# Patient Record
Sex: MALE | Race: WHITE | NOT HISPANIC OR LATINO | Employment: FULL TIME | URBAN - METROPOLITAN AREA
[De-identification: names, ages, dates, MRNs, and addresses within clinical notes are randomized per-mention and may not be internally consistent; named-entity substitution may affect disease eponyms.]

---

## 2024-07-05 ENCOUNTER — APPOINTMENT (OUTPATIENT)
Dept: PRIMARY CARE | Facility: CLINIC | Age: 39
End: 2024-07-05
Payer: COMMERCIAL

## 2024-07-09 ENCOUNTER — APPOINTMENT (OUTPATIENT)
Dept: PRIMARY CARE | Facility: CLINIC | Age: 39
End: 2024-07-09
Payer: COMMERCIAL

## 2024-07-09 VITALS
SYSTOLIC BLOOD PRESSURE: 125 MMHG | WEIGHT: 154.7 LBS | HEART RATE: 80 BPM | HEIGHT: 69 IN | DIASTOLIC BLOOD PRESSURE: 70 MMHG | BODY MASS INDEX: 22.91 KG/M2

## 2024-07-09 DIAGNOSIS — N50.811 PAIN IN RIGHT TESTICLE: ICD-10-CM

## 2024-07-09 DIAGNOSIS — L98.9 BACK SKIN LESION: ICD-10-CM

## 2024-07-09 DIAGNOSIS — Z11.3 ROUTINE SCREENING FOR STI (SEXUALLY TRANSMITTED INFECTION): ICD-10-CM

## 2024-07-09 DIAGNOSIS — S39.012A STRAIN OF MUSCLE, FASCIA AND TENDON OF LOWER BACK, INITIAL ENCOUNTER: Primary | ICD-10-CM

## 2024-07-09 DIAGNOSIS — R10.31 RIGHT LOWER QUADRANT ABDOMINAL PAIN: ICD-10-CM

## 2024-07-09 DIAGNOSIS — Z00.00 ROUTINE GENERAL MEDICAL EXAMINATION AT A HEALTH CARE FACILITY: ICD-10-CM

## 2024-07-09 PROCEDURE — 1036F TOBACCO NON-USER: CPT | Performed by: INTERNAL MEDICINE

## 2024-07-09 PROCEDURE — 99204 OFFICE O/P NEW MOD 45 MIN: CPT | Performed by: INTERNAL MEDICINE

## 2024-07-09 RX ORDER — CYCLOBENZAPRINE HCL 10 MG
10 TABLET ORAL 3 TIMES DAILY PRN
Qty: 30 TABLET | Refills: 0 | Status: SHIPPED | OUTPATIENT
Start: 2024-07-09 | End: 2024-09-07

## 2024-07-09 RX ORDER — METHYLPREDNISOLONE 4 MG/1
TABLET ORAL
Qty: 21 TABLET | Refills: 0 | Status: SHIPPED | OUTPATIENT
Start: 2024-07-09 | End: 2024-07-16

## 2024-07-09 ASSESSMENT — PATIENT HEALTH QUESTIONNAIRE - PHQ9
1. LITTLE INTEREST OR PLEASURE IN DOING THINGS: NOT AT ALL
2. FEELING DOWN, DEPRESSED OR HOPELESS: NOT AT ALL
SUM OF ALL RESPONSES TO PHQ9 QUESTIONS 1 AND 2: 0

## 2024-07-09 ASSESSMENT — ENCOUNTER SYMPTOMS
BACK PAIN: 1
ABDOMINAL PAIN: 1
FREQUENCY: 1
CONSTIPATION: 0
DIARRHEA: 0

## 2024-07-09 NOTE — ASSESSMENT & PLAN NOTE
-Concern for muscle strain. Will trial muscle relaxer. Discussed how to take medication safely; will likely only be able to use at night. Medrol dose pack sent in as well.  -Referring to PT in case symptoms persist.

## 2024-07-09 NOTE — PROGRESS NOTES
"Subjective   Patient ID: Ralph Swan is a 39 y.o. male who presents for Establish Care.    Pt here for multiple reasons; has been having RLQ pain, R testicular pain, R-sided back pain, has a back lump, and wants to get appropriate cancer screening done.    States he hurt his back 2 weeks ago while hiking. Thinks he pulled a muscle at that time. Has pain along the lower R side of his back that gets worse from flexing his hips and from laying down at night. Has tried motrin w/o relief. Tries to stretch frequently for this and has tried warm bathes with mild relief.    RLQ pain started end of last summer/early fall. Was seen at [facility] and diagnosed w/ pancreatitis at that time. Pain feels like a sharp pressure that can occasionally radiate burning pain to the groin. Pain is intermittent and pt isn't sure what brings it on. Has regular BMs, but has noticed he has increased urinary frequency (has to go every 30-60 mins).    R testicular pain started 3 months ago. States he has always been sensitive there his entire life. Has been with the same partner and denies any new ones over the last year. Is wondering if he can be tested for STIs just in case though.    Finally has lump on his L lower back which he first noticed a month ago. Area is painful to touch, but hasn't changed in size since he noticed it.        Review of Systems   Gastrointestinal:  Positive for abdominal pain. Negative for constipation and diarrhea.   Genitourinary:  Positive for frequency and testicular pain.   Musculoskeletal:  Positive for back pain.       /70 (BP Location: Right arm, Patient Position: Sitting, BP Cuff Size: Adult)   Pulse 80   Ht 1.756 m (5' 9.15\")   Wt 70.2 kg (154 lb 11.2 oz)   BMI 22.74 kg/m²   Objective   Physical Exam  Constitutional:       General: He is not in acute distress.     Appearance: He is not ill-appearing, toxic-appearing or diaphoretic.   HENT:      Head: Normocephalic and atraumatic.   Eyes:      " Conjunctiva/sclera: Conjunctivae normal.   Cardiovascular:      Rate and Rhythm: Normal rate and regular rhythm.      Heart sounds: No murmur heard.     No friction rub. No gallop.   Pulmonary:      Effort: Pulmonary effort is normal. No respiratory distress.      Breath sounds: No stridor. No wheezing, rhonchi or rales.   Abdominal:      General: Abdomen is flat. Bowel sounds are normal. There is no distension.      Palpations: Abdomen is soft. There is no mass.      Tenderness: There is no abdominal tenderness. There is right CVA tenderness. There is no guarding.      Hernia: No hernia is present.   Musculoskeletal:         General: Tenderness (L lower back with mobile lesion that is <1cm; above L sacroiliac joint) present.   Neurological:      Mental Status: He is alert.         Assessment/Plan   Problem List Items Addressed This Visit             ICD-10-CM    Strain of muscle, fascia and tendon of lower back, initial encounter - Primary S39.012A     -Concern for muscle strain. Will trial muscle relaxer. Discussed how to take medication safely; will likely only be able to use at night. Medrol dose pack sent in as well.  -Referring to PT in case symptoms persist.         Relevant Medications    cyclobenzaprine (Flexeril) 10 mg tablet    methylPREDNISolone (Medrol Dospak) 4 mg tablets    Other Relevant Orders    Referral to Physical Therapy    Right lower quadrant abdominal pain R10.31     -Unclear etiology. Has had pain since last Fall, but is currently obscured by back pain.  -CT A/P from ER visit reviewed. No signs of hernia noted. Will get repeat labwork and UA to start and address back pain.         Relevant Orders    Comprehensive metabolic panel    CBC and Auto Differential    Urinalysis with Reflex Culture and Microscopic    Pain in right testicle N50.811     -Testing for STI.  -Referring to urology given hx. Pt agreeable.         Back skin lesion L98.9     -Concern for lipoma. Will try to get US to better  visualize to start.         Relevant Orders    US pelvis     Other Visit Diagnoses         Codes    Routine screening for STI (sexually transmitted infection)     Z11.3    Relevant Orders    Referral to Urology    Hepatitis B Surface Antigen    C. Trachomatis / N. Gonorrhoeae, Amplified Detection    HIV 1/2 Antigen/Antibody Screen with Reflex to Confirmation    Hepatitis C Antibody    Syphilis Screen with Reflex    Routine general medical examination at a health care facility     Z00.00    Relevant Orders    Hemoglobin A1c    Lipid panel        -Labwork as above.  -Will see back in 1 month to continue workup.         Zora Hickman MD 07/09/24 1:07 PM

## 2024-07-09 NOTE — ASSESSMENT & PLAN NOTE
-Unclear etiology. Has had pain since last Fall, but is currently obscured by back pain.  -CT A/P from ER visit reviewed. No signs of hernia noted. Will get repeat labwork and UA to start and address back pain.

## 2024-07-23 ENCOUNTER — HOSPITAL ENCOUNTER (OUTPATIENT)
Dept: RADIOLOGY | Facility: HOSPITAL | Age: 39
Discharge: HOME | End: 2024-07-23
Payer: COMMERCIAL

## 2024-07-23 DIAGNOSIS — L98.9 BACK SKIN LESION: ICD-10-CM

## 2024-07-23 PROCEDURE — 76882 US LMTD JT/FCL EVL NVASC XTR: CPT

## 2024-07-24 ENCOUNTER — TELEPHONE (OUTPATIENT)
Dept: PRIMARY CARE | Facility: CLINIC | Age: 39
End: 2024-07-24
Payer: COMMERCIAL

## 2024-07-24 NOTE — TELEPHONE ENCOUNTER
Patient called to inform you that he also found a lump on the back of his neck as well.  He did go for the US for the one on his back.

## 2024-07-26 ENCOUNTER — TELEPHONE (OUTPATIENT)
Dept: PRIMARY CARE | Facility: CLINIC | Age: 39
End: 2024-07-26

## 2024-07-26 ENCOUNTER — LAB (OUTPATIENT)
Dept: LAB | Facility: LAB | Age: 39
End: 2024-07-26
Payer: COMMERCIAL

## 2024-07-26 DIAGNOSIS — R10.31 RIGHT LOWER QUADRANT ABDOMINAL PAIN: ICD-10-CM

## 2024-07-26 DIAGNOSIS — L98.9 BACK SKIN LESION: Primary | ICD-10-CM

## 2024-07-26 DIAGNOSIS — L72.9 CYST OF SKIN: Primary | ICD-10-CM

## 2024-07-26 DIAGNOSIS — Z00.00 ROUTINE GENERAL MEDICAL EXAMINATION AT A HEALTH CARE FACILITY: ICD-10-CM

## 2024-07-26 DIAGNOSIS — Z11.3 ROUTINE SCREENING FOR STI (SEXUALLY TRANSMITTED INFECTION): ICD-10-CM

## 2024-07-26 LAB
ALBUMIN SERPL BCP-MCNC: 4.5 G/DL (ref 3.4–5)
ALP SERPL-CCNC: 57 U/L (ref 33–120)
ALT SERPL W P-5'-P-CCNC: 17 U/L (ref 10–52)
ANION GAP SERPL CALC-SCNC: 13 MMOL/L (ref 10–20)
APPEARANCE UR: CLEAR
AST SERPL W P-5'-P-CCNC: 19 U/L (ref 9–39)
BASOPHILS # BLD AUTO: 0.06 X10*3/UL (ref 0–0.1)
BASOPHILS NFR BLD AUTO: 1.3 %
BILIRUB SERPL-MCNC: 0.6 MG/DL (ref 0–1.2)
BILIRUB UR STRIP.AUTO-MCNC: NEGATIVE MG/DL
BUN SERPL-MCNC: 25 MG/DL (ref 6–23)
C TRACH RRNA SPEC QL NAA+PROBE: NEGATIVE
CALCIUM SERPL-MCNC: 9.7 MG/DL (ref 8.6–10.6)
CHLORIDE SERPL-SCNC: 102 MMOL/L (ref 98–107)
CHOLEST SERPL-MCNC: 148 MG/DL (ref 0–199)
CHOLESTEROL/HDL RATIO: 3.8
CO2 SERPL-SCNC: 30 MMOL/L (ref 21–32)
COLOR UR: NORMAL
CREAT SERPL-MCNC: 1.08 MG/DL (ref 0.5–1.3)
EGFRCR SERPLBLD CKD-EPI 2021: >90 ML/MIN/1.73M*2
EOSINOPHIL # BLD AUTO: 0.06 X10*3/UL (ref 0–0.7)
EOSINOPHIL NFR BLD AUTO: 1.3 %
ERYTHROCYTE [DISTWIDTH] IN BLOOD BY AUTOMATED COUNT: 11.9 % (ref 11.5–14.5)
EST. AVERAGE GLUCOSE BLD GHB EST-MCNC: 94 MG/DL
GLUCOSE SERPL-MCNC: 97 MG/DL (ref 74–99)
GLUCOSE UR STRIP.AUTO-MCNC: NORMAL MG/DL
HBA1C MFR BLD: 4.9 %
HBV SURFACE AG SERPL QL IA: NONREACTIVE
HCT VFR BLD AUTO: 44.9 % (ref 41–52)
HCV AB SER QL: NONREACTIVE
HDLC SERPL-MCNC: 38.5 MG/DL
HGB BLD-MCNC: 15.2 G/DL (ref 13.5–17.5)
HIV 1+2 AB+HIV1 P24 AG SERPL QL IA: NONREACTIVE
HOLD SPECIMEN: NORMAL
IMM GRANULOCYTES # BLD AUTO: 0.01 X10*3/UL (ref 0–0.7)
IMM GRANULOCYTES NFR BLD AUTO: 0.2 % (ref 0–0.9)
KETONES UR STRIP.AUTO-MCNC: NEGATIVE MG/DL
LDLC SERPL CALC-MCNC: 94 MG/DL
LEUKOCYTE ESTERASE UR QL STRIP.AUTO: NEGATIVE
LYMPHOCYTES # BLD AUTO: 1.3 X10*3/UL (ref 1.2–4.8)
LYMPHOCYTES NFR BLD AUTO: 28.4 %
MCH RBC QN AUTO: 31.1 PG (ref 26–34)
MCHC RBC AUTO-ENTMCNC: 33.9 G/DL (ref 32–36)
MCV RBC AUTO: 92 FL (ref 80–100)
MONOCYTES # BLD AUTO: 0.4 X10*3/UL (ref 0.1–1)
MONOCYTES NFR BLD AUTO: 8.7 %
N GONORRHOEA DNA SPEC QL PROBE+SIG AMP: NEGATIVE
NEUTROPHILS # BLD AUTO: 2.75 X10*3/UL (ref 1.2–7.7)
NEUTROPHILS NFR BLD AUTO: 60.1 %
NITRITE UR QL STRIP.AUTO: NEGATIVE
NON HDL CHOLESTEROL: 110 MG/DL (ref 0–149)
NRBC BLD-RTO: 0 /100 WBCS (ref 0–0)
PH UR STRIP.AUTO: 7 [PH]
PLATELET # BLD AUTO: 274 X10*3/UL (ref 150–450)
POTASSIUM SERPL-SCNC: 4 MMOL/L (ref 3.5–5.3)
PROT SERPL-MCNC: 6.9 G/DL (ref 6.4–8.2)
PROT UR STRIP.AUTO-MCNC: NEGATIVE MG/DL
RBC # BLD AUTO: 4.89 X10*6/UL (ref 4.5–5.9)
RBC # UR STRIP.AUTO: NEGATIVE /UL
SODIUM SERPL-SCNC: 141 MMOL/L (ref 136–145)
SP GR UR STRIP.AUTO: 1.02
TREPONEMA PALLIDUM IGG+IGM AB [PRESENCE] IN SERUM OR PLASMA BY IMMUNOASSAY: NONREACTIVE
TRIGL SERPL-MCNC: 78 MG/DL (ref 0–149)
UROBILINOGEN UR STRIP.AUTO-MCNC: NORMAL MG/DL
VLDL: 16 MG/DL (ref 0–40)
WBC # BLD AUTO: 4.6 X10*3/UL (ref 4.4–11.3)

## 2024-07-26 PROCEDURE — 80061 LIPID PANEL: CPT

## 2024-07-26 PROCEDURE — 86803 HEPATITIS C AB TEST: CPT

## 2024-07-26 PROCEDURE — 81003 URINALYSIS AUTO W/O SCOPE: CPT

## 2024-07-26 PROCEDURE — 80053 COMPREHEN METABOLIC PANEL: CPT

## 2024-07-26 PROCEDURE — 86780 TREPONEMA PALLIDUM: CPT

## 2024-07-26 PROCEDURE — 83036 HEMOGLOBIN GLYCOSYLATED A1C: CPT

## 2024-07-26 PROCEDURE — 85025 COMPLETE CBC W/AUTO DIFF WBC: CPT

## 2024-07-26 PROCEDURE — 87389 HIV-1 AG W/HIV-1&-2 AB AG IA: CPT

## 2024-07-26 PROCEDURE — 87340 HEPATITIS B SURFACE AG IA: CPT

## 2024-07-26 PROCEDURE — 87591 N.GONORRHOEAE DNA AMP PROB: CPT

## 2024-07-26 PROCEDURE — 87491 CHLMYD TRACH DNA AMP PROBE: CPT

## 2024-07-26 PROCEDURE — 36415 COLL VENOUS BLD VENIPUNCTURE: CPT

## 2024-07-26 NOTE — TELEPHONE ENCOUNTER
----- Message from Zora Hickman sent at 7/26/2024 10:20 AM EDT -----  US showing non-specific cyst. Recommend pt be seen in diagnostic clinic to see if it needs to be drained and sent to lab, in addition to further eval of new neck lump. Referral placed.

## 2024-08-07 ENCOUNTER — OFFICE VISIT (OUTPATIENT)
Dept: PRIMARY CARE | Facility: CLINIC | Age: 39
End: 2024-08-07
Payer: COMMERCIAL

## 2024-08-07 VITALS
WEIGHT: 158.2 LBS | SYSTOLIC BLOOD PRESSURE: 135 MMHG | DIASTOLIC BLOOD PRESSURE: 82 MMHG | HEART RATE: 93 BPM | RESPIRATION RATE: 18 BRPM | BODY MASS INDEX: 23.26 KG/M2

## 2024-08-07 DIAGNOSIS — S39.012A STRAIN OF MUSCLE, FASCIA AND TENDON OF LOWER BACK, INITIAL ENCOUNTER: ICD-10-CM

## 2024-08-07 DIAGNOSIS — N50.811 PAIN IN RIGHT TESTICLE: ICD-10-CM

## 2024-08-07 DIAGNOSIS — R10.31 RIGHT LOWER QUADRANT ABDOMINAL PAIN: Primary | ICD-10-CM

## 2024-08-07 DIAGNOSIS — F90.9 ATTENTION DEFICIT HYPERACTIVITY DISORDER (ADHD), UNSPECIFIED ADHD TYPE: ICD-10-CM

## 2024-08-07 PROCEDURE — 99212 OFFICE O/P EST SF 10 MIN: CPT | Performed by: INTERNAL MEDICINE

## 2024-08-07 PROCEDURE — 1036F TOBACCO NON-USER: CPT | Performed by: INTERNAL MEDICINE

## 2024-08-07 ASSESSMENT — ENCOUNTER SYMPTOMS
ABDOMINAL PAIN: 1
BACK PAIN: 1
CONSTIPATION: 0

## 2024-08-07 NOTE — PROGRESS NOTES
Subjective   Patient ID: Ralph Swan is a 39 y.o. male who presents for Follow-up (1m follow-up).    Back pain ultimately improved. Didn't end up taking steroids d/t concern for side effects.    Pain along RLQ has been present for almost a year. Pain feels occasionally sharp, occasionally sore, or more like a rolling pain. Radiates from RLQ to groin but doesn't fully go to that area. Comes on from movement, heavy lifting and from holding his bladder (gets relief from urinating). Pain has worsened and pt feels weaker since it started. Has hx appendectomy in 2011.         Review of Systems   Gastrointestinal:  Positive for abdominal pain. Negative for constipation.   Musculoskeletal:  Positive for back pain.       /82 (BP Location: Right arm, Patient Position: Sitting)   Pulse 93   Resp 18   Wt 71.8 kg (158 lb 3.2 oz)   BMI 23.26 kg/m²   Objective   Physical Exam  Constitutional:       General: He is not in acute distress.     Appearance: He is not ill-appearing, toxic-appearing or diaphoretic.   HENT:      Head: Normocephalic and atraumatic.   Eyes:      Conjunctiva/sclera: Conjunctivae normal.   Abdominal:      General: Abdomen is flat. There is no distension.      Palpations: Abdomen is soft. There is no mass.      Tenderness: There is abdominal tenderness (epigastric). There is no guarding.   Musculoskeletal:      Comments: 1 Mobile nodule along R temple and another along c-spine   Neurological:      Mental Status: He is alert.         Assessment/Plan   Problem List Items Addressed This Visit             ICD-10-CM    Strain of muscle, fascia and tendon of lower back, initial encounter S39.012A    Right lower quadrant abdominal pain - Primary R10.31    Pain in right testicle N50.811     Other Visit Diagnoses         Codes    Attention deficit hyperactivity disorder (ADHD), unspecified ADHD type     F90.9    Relevant Orders    Referral to [facility]        -Unclear etiology of  abd/testicular pain. Pt to follow up with urology later this month. Won't repeat CT given it was last done 9/2023 and symptoms had already started before that. Pt agreeable with plan for this and to follow up general surgery recommendations first.  -Pt w/ hx ADHD when he was 9 but stopped treatment at age 20. Referred to psychiatry to be diagnosed.  -To return in 2 months.         Zora Hickman MD 08/07/24 2:19 PM

## 2024-08-09 ENCOUNTER — APPOINTMENT (OUTPATIENT)
Dept: PRIMARY CARE | Facility: CLINIC | Age: 39
End: 2024-08-09
Payer: COMMERCIAL

## 2024-08-09 ENCOUNTER — OFFICE VISIT (OUTPATIENT)
Dept: SURGERY | Facility: CLINIC | Age: 39
End: 2024-08-09
Payer: COMMERCIAL

## 2024-08-09 VITALS
WEIGHT: 158 LBS | TEMPERATURE: 98 F | BODY MASS INDEX: 23.23 KG/M2 | HEART RATE: 101 BPM | SYSTOLIC BLOOD PRESSURE: 121 MMHG | DIASTOLIC BLOOD PRESSURE: 83 MMHG

## 2024-08-09 DIAGNOSIS — L72.9 CYST OF SKIN: Primary | ICD-10-CM

## 2024-08-09 PROCEDURE — 99213 OFFICE O/P EST LOW 20 MIN: CPT | Performed by: SURGERY

## 2024-08-09 PROCEDURE — 1036F TOBACCO NON-USER: CPT | Performed by: SURGERY

## 2024-08-09 PROCEDURE — 99203 OFFICE O/P NEW LOW 30 MIN: CPT | Performed by: SURGERY

## 2024-08-09 ASSESSMENT — ENCOUNTER SYMPTOMS: DEPRESSION: 0

## 2024-08-09 ASSESSMENT — PAIN SCALES - GENERAL: PAINLEVEL: 0-NO PAIN

## 2024-08-09 NOTE — PROGRESS NOTES
Subjective   Patient ID: Ralph Swan is a 39 y.o. male who presents for     HPI  Patient was referred by his PCP for cysts. He noticed lumps forming over the past few months. States he has one on his forehead, the back of his neck, and his right lower back. States the right lower back lump can be painful when it rubs on his clothing. Patient had an US and was told he had a lipoma on his upper back.     PMH- denies any health issues    PSH- appendectomy, tonsillectomy    Patient vapes 20 times a day    Review of Systems    Objective   Physical Exam    Well-nourished, well-developed. In no distress  Alert and oriented x 3  Lungs are clear to auscultation bilaterally.  Cardiac exam is regular rhythm and rate.  Abdomen is soft nontender nondistended.  Neurologic exam is without focal deficit.   Very subtle subcutaneous nodule left flank just above the belt line    Very subtle soft tissue density midline posterior neck        US: 7/24/24  Nonspecific 6 x 5 x 4 mm cyst in the subcutaneous fat along the left upper back.        Assessment/Plan   Diagnoses and all orders for this visit:  Cyst of skin  -     Referral to General Surgery  -     Case Request Operating Room: Excision Lesion Abdominal Wall; Standing  Other orders  -     Place in outpatient/hospital ambulatory surgery; Standing  -     Full code; Standing  Impressio:   Very small cysts and I have reassured him these are benign.  The lesion in the left flank is bothering him with pain and tenderness.  Plan to take this out as a minor procedure under local       Verna Shannon DPM PGY-1  
Calm

## 2024-08-09 NOTE — LETTER
August 9, 2024     Zora Hickman MD  [address]  [address]  [facility] [address]    Patient: Ralph Swan   YOB: 1985   Date of Visit: 8/9/2024       Dear Dr. Zora Hickman MD:    Thank you for referring Ralph Swan to me for evaluation. Below are my notes for this consultation.  If you have questions, please do not hesitate to call me. I look forward to following your patient along with you.       Sincerely,     Naresh Mccall MD      CC: No Recipients  ______________________________________________________________________________________    Subjective  Patient ID: Ralph Swan is a 39 y.o. male who presents for     HPI  Patient was referred by his PCP for cysts. He noticed lumps forming over the past few months. States he has one on his forehead, the back of his neck, and his right lower back. States the right lower back lump can be painful when it rubs on his clothing. Patient had an US and was told he had a lipoma on his upper back.     PMH- denies any health issues    PSH- appendectomy, tonsillectomy    Patient vapes 20 times a day    Review of Systems    Objective  Physical Exam    Well-nourished, well-developed. In no distress  Alert and oriented x 3  Lungs are clear to auscultation bilaterally.  Cardiac exam is regular rhythm and rate.  Abdomen is soft nontender nondistended.  Neurologic exam is without focal deficit.   Very subtle subcutaneous nodule left flank just above the belt line    Very subtle soft tissue density midline posterior neck        US: 7/24/24  Nonspecific 6 x 5 x 4 mm cyst in the subcutaneous fat along the left upper back.        Assessment/Plan  Diagnoses and all orders for this visit:  Cyst of skin  -     Referral to General Surgery  -     Case Request Operating Room: Excision Lesion Abdominal Wall; Standing  Other orders  -     Place in outpatient/hospital ambulatory surgery; Standing  -     Full code; Standing  Impressio:   Very small cysts and I have  reassured him these are benign.  The lesion in the left flank is bothering him with pain and tenderness.  Plan to take this out as a minor procedure under local       Verna Shannon DPM PGY-1

## 2024-08-09 NOTE — H&P (VIEW-ONLY)
Subjective   Patient ID: Ralph Swan is a 39 y.o. male who presents for     HPI  Patient was referred by his PCP for cysts. He noticed lumps forming over the past few months. States he has one on his forehead, the back of his neck, and his right lower back. States the right lower back lump can be painful when it rubs on his clothing. Patient had an US and was told he had a lipoma on his upper back.     PMH- denies any health issues    PSH- appendectomy, tonsillectomy    Patient vapes 20 times a day    Review of Systems    Objective   Physical Exam    Well-nourished, well-developed. In no distress  Alert and oriented x 3  Lungs are clear to auscultation bilaterally.  Cardiac exam is regular rhythm and rate.  Abdomen is soft nontender nondistended.  Neurologic exam is without focal deficit.   Very subtle subcutaneous nodule left flank just above the belt line    Very subtle soft tissue density midline posterior neck        US: 7/24/24  Nonspecific 6 x 5 x 4 mm cyst in the subcutaneous fat along the left upper back.        Assessment/Plan   Diagnoses and all orders for this visit:  Cyst of skin  -     Referral to General Surgery  -     Case Request Operating Room: Excision Lesion Abdominal Wall; Standing  Other orders  -     Place in outpatient/hospital ambulatory surgery; Standing  -     Full code; Standing  Impressio:   Very small cysts and I have reassured him these are benign.  The lesion in the left flank is bothering him with pain and tenderness.  Plan to take this out as a minor procedure under local       Verna Shannon DPM PGY-1

## 2024-08-14 PROBLEM — L72.9 CYST OF SKIN: Status: ACTIVE | Noted: 2024-08-09

## 2024-09-04 ENCOUNTER — HOSPITAL ENCOUNTER (OUTPATIENT)
Facility: CLINIC | Age: 39
Setting detail: OUTPATIENT SURGERY
Discharge: HOME | End: 2024-09-04
Attending: SURGERY | Admitting: SURGERY
Payer: COMMERCIAL

## 2024-09-04 VITALS
BODY MASS INDEX: 23.39 KG/M2 | RESPIRATION RATE: 16 BRPM | SYSTOLIC BLOOD PRESSURE: 127 MMHG | HEART RATE: 58 BPM | WEIGHT: 163.36 LBS | TEMPERATURE: 98.2 F | HEIGHT: 70 IN | OXYGEN SATURATION: 99 % | DIASTOLIC BLOOD PRESSURE: 78 MMHG

## 2024-09-04 DIAGNOSIS — L72.9 CYST OF SKIN: ICD-10-CM

## 2024-09-04 PROCEDURE — 2500000005 HC RX 250 GENERAL PHARMACY W/O HCPCS: Performed by: SURGERY

## 2024-09-04 PROCEDURE — 3600000008 HC OR TIME - EACH INCREMENTAL 1 MINUTE - PROCEDURE LEVEL THREE: Performed by: SURGERY

## 2024-09-04 PROCEDURE — 2720000007 HC OR 272 NO HCPCS: Performed by: SURGERY

## 2024-09-04 PROCEDURE — 7100000010 HC PHASE TWO TIME - EACH INCREMENTAL 1 MINUTE: Performed by: SURGERY

## 2024-09-04 PROCEDURE — 3600000003 HC OR TIME - INITIAL BASE CHARGE - PROCEDURE LEVEL THREE: Performed by: SURGERY

## 2024-09-04 PROCEDURE — 11402 EXC TR-EXT B9+MARG 1.1-2 CM: CPT | Performed by: SURGERY

## 2024-09-04 PROCEDURE — 2500000004 HC RX 250 GENERAL PHARMACY W/ HCPCS (ALT 636 FOR OP/ED): Mod: JG | Performed by: SURGERY

## 2024-09-04 PROCEDURE — 7100000009 HC PHASE TWO TIME - INITIAL BASE CHARGE: Performed by: SURGERY

## 2024-09-04 RX ORDER — OXYCODONE HYDROCHLORIDE 5 MG/1
5 TABLET ORAL EVERY 6 HOURS PRN
Qty: 5 TABLET | Refills: 0 | Status: SHIPPED | OUTPATIENT
Start: 2024-09-04

## 2024-09-04 RX ORDER — SODIUM CHLORIDE 0.9 G/100ML
IRRIGANT IRRIGATION AS NEEDED
Status: DISCONTINUED | OUTPATIENT
Start: 2024-09-04 | End: 2024-09-04 | Stop reason: HOSPADM

## 2024-09-04 ASSESSMENT — COLUMBIA-SUICIDE SEVERITY RATING SCALE - C-SSRS
1. IN THE PAST MONTH, HAVE YOU WISHED YOU WERE DEAD OR WISHED YOU COULD GO TO SLEEP AND NOT WAKE UP?: NO
2. HAVE YOU ACTUALLY HAD ANY THOUGHTS OF KILLING YOURSELF?: NO
6. HAVE YOU EVER DONE ANYTHING, STARTED TO DO ANYTHING, OR PREPARED TO DO ANYTHING TO END YOUR LIFE?: NO

## 2024-09-04 ASSESSMENT — PAIN - FUNCTIONAL ASSESSMENT
PAIN_FUNCTIONAL_ASSESSMENT: 0-10
PAIN_FUNCTIONAL_ASSESSMENT: 0-10

## 2024-09-04 ASSESSMENT — PAIN SCALES - GENERAL
PAINLEVEL_OUTOF10: 0 - NO PAIN
PAINLEVEL_OUTOF10: 0 - NO PAIN

## 2024-09-04 NOTE — OP NOTE
Excision Lesion Abdominal Wall (L) Operative Note     Date: 2024  OR Location: [facility] OR    Name: Ralph Swan, : 1985, Age: 39 y.o., MRN: [id number], Sex: male    Diagnosis  Pre-op Diagnosis      * Cyst of skin [L72.9] Post-op Diagnosis     * Cyst of skin [L72.9]     Procedures  Excisional biopsy of left flank cyst       Surgeons      * Naresh Mccall - Primary    Resident/Fellow/Other Assistant:  Surgeons and Role:  * No surgeons found with a matching role *    Procedure Summary  Anesthesia: Anesthesia type not filed in the log.  ASA: ASA status not filed in the log.  Anesthesia Staff: No anesthesia staff entered.  Estimated Blood Loss: 1 mL  Intra-op Medications:   Administrations occurring from 0830 to 0930 on 24:   Medication Name Total Dose   sodium chloride 0.9 % irrigation solution 250 mL   BUPivacaine HCl (Marcaine) 0.5 % (5 mg/mL) 10 mL, lidocaine (Xylocaine) 10 mL syringe 5 mL         Intraprocedure I/O Totals       None           Specimen:   ID Type Source Tests Collected by Time   1 : LEFT FLANK SKIN CYST Tissue SKIN CYST SURGICAL PATHOLOGY EXAM Naresh Mccall MD 2024 0905        Staff:   Circulator: Sylvie Tompkins Person: Mare  Circulator: Gabriela         Indications: Ralph Swan is an 39 y.o. male who is having surgery for Cyst of skin [L72.9].     The patient was seen in the preoperative area. The risks, benefits, complications, treatment options, non-operative alternatives, expected recovery and outcomes were discussed with the patient. The possibilities of reaction to medication, pulmonary aspiration, injury to surrounding structures, bleeding, recurrent infection, the need for additional procedures, failure to diagnose a condition, and creating a complication requiring transfusion or operation were discussed with the patient. The patient concurred with the proposed plan, giving informed consent.  The site of surgery was properly noted/marked if necessary per policy. The  patient has been actively warmed in preoperative area. Preoperative antibiotics have been ordered and given within 1 hours of incision. Venous thrombosis prophylaxis have been ordered including bilateral sequential compression devices     Procedure Details: Patient is a 39-year-old gentleman who comes in for excisional biopsy of a soft tissue mass involving his left flank that is causing irritation.  Risk benefits alternatives were discussed preoperatively and he agrees to the operation.    Description of procedure: Patient taken operating room, placed supine on the operating table his repositioned in the decubitus position with the left side up.  On exam he is noted to have a very subtle subcutaneous nodule left flank just above the belt line this area was sterilely prepared.  We infiltrated with solution 1% lidocaine.  We made a transverse incision in, carried our dissection down subcutaneous tissues we readily encountered what appeared to be a small cystic lesion.  It was excised and handed off the field as a specimen.  It measured 1.1 cm.  We irrigated the wound and then after ensuring hemostasis we closed the incision using 4-0 interrupted subdermal stitches followed by Dermabond glue.  Patient tolerated the procedure without difficulty and was returned to the recovery room in stable condition.    Complications:  None; patient tolerated the procedure well.    Disposition: PACU - hemodynamically stable.  Condition: stable         Attending Attestation: I was present and scrubbed for the entire procedure.    Naresh Mccall  Phone Number: [phone]

## 2024-09-06 ENCOUNTER — APPOINTMENT (OUTPATIENT)
Dept: BEHAVIORAL HEALTH | Facility: CLINIC | Age: 39
End: 2024-09-06
Payer: COMMERCIAL

## 2024-09-12 LAB
LABORATORY COMMENT REPORT: NORMAL
PATH REPORT.FINAL DX SPEC: NORMAL
PATH REPORT.GROSS SPEC: NORMAL
PATH REPORT.RELEVANT HX SPEC: NORMAL
PATH REPORT.TOTAL CANCER: NORMAL

## 2025-02-14 ENCOUNTER — APPOINTMENT (OUTPATIENT)
Dept: PRIMARY CARE | Facility: CLINIC | Age: 40
End: 2025-02-14
Payer: COMMERCIAL

## 2025-02-18 ENCOUNTER — APPOINTMENT (OUTPATIENT)
Dept: PRIMARY CARE | Facility: CLINIC | Age: 40
End: 2025-02-18
Payer: COMMERCIAL

## (undated) DEVICE — GLOVE, SURGICAL, PROTEXIS PI BLUE W/NEUTHERA, 8.0, PF, LF

## (undated) DEVICE — PAD, GROUNDING, ELECTROSURGICAL, W/9 FT CABLE, POLYHESIVE II, ADULT, LF

## (undated) DEVICE — EVACUATION SYSTEM, SMOKE, LAPAROSCOPIC, ULTRA FLOW, PLUME AWAY LAPAROSCOPIC, LF

## (undated) DEVICE — APPLICATOR, CHLORAPREP, W/ORANGE TINT, 26ML

## (undated) DEVICE — DRAPE PACK, LAP CHOLE, CUSTOM, GENEVA

## (undated) DEVICE — PUMP, STRYKERFLOW 2 & HANDPIECE W/10FT. IRRIGATION TUBING

## (undated) DEVICE — GLOVE, SURGICAL, PROTEXIS PI , 7.5, PF, LF

## (undated) DEVICE — SUTURE, MONOCRYL, 4-0, 18 IN, PS2, UNDYED